# Patient Record
Sex: FEMALE | Race: AMERICAN INDIAN OR ALASKA NATIVE | HISPANIC OR LATINO
[De-identification: names, ages, dates, MRNs, and addresses within clinical notes are randomized per-mention and may not be internally consistent; named-entity substitution may affect disease eponyms.]

---

## 2022-12-21 PROBLEM — Z00.00 ENCOUNTER FOR PREVENTIVE HEALTH EXAMINATION: Status: ACTIVE | Noted: 2022-12-21

## 2022-12-22 ENCOUNTER — APPOINTMENT (OUTPATIENT)
Dept: OBGYN | Facility: CLINIC | Age: 32
End: 2022-12-22

## 2023-12-07 ENCOUNTER — APPOINTMENT (OUTPATIENT)
Dept: NEUROLOGY | Facility: CLINIC | Age: 33
End: 2023-12-07
Payer: MEDICAID

## 2023-12-07 VITALS
SYSTOLIC BLOOD PRESSURE: 100 MMHG | DIASTOLIC BLOOD PRESSURE: 64 MMHG | WEIGHT: 176 LBS | HEIGHT: 62 IN | HEART RATE: 68 BPM | BODY MASS INDEX: 32.39 KG/M2

## 2023-12-07 PROCEDURE — 99204 OFFICE O/P NEW MOD 45 MIN: CPT

## 2024-01-02 ENCOUNTER — APPOINTMENT (OUTPATIENT)
Dept: NEUROLOGY | Facility: CLINIC | Age: 34
End: 2024-01-02
Payer: MEDICAID

## 2024-01-02 VITALS — WEIGHT: 176 LBS | HEIGHT: 62 IN | BODY MASS INDEX: 32.39 KG/M2

## 2024-01-02 DIAGNOSIS — M54.2 CERVICALGIA: ICD-10-CM

## 2024-01-02 DIAGNOSIS — G44.209 TENSION-TYPE HEADACHE, UNSPECIFIED, NOT INTRACTABLE: ICD-10-CM

## 2024-01-02 PROCEDURE — 99214 OFFICE O/P EST MOD 30 MIN: CPT

## 2024-01-02 RX ORDER — DICLOFENAC SODIUM 20 MG/G
2 SOLUTION TOPICAL
Qty: 1 | Refills: 0 | Status: ACTIVE | COMMUNITY
Start: 2024-01-02 | End: 1900-01-01

## 2024-01-02 NOTE — ASSESSMENT
[FreeTextEntry1] : 33 year old female with tension type headaches. Today we reviewed her MRI of the brain noted above. Patient's headaches are primarily isolated to her neck / occiput with tenderness to the surrounding muscle groups without neck pain, radiculopathy  or limited ROM. I have provided a script for her to begin Pt for her neck as well as topical Diclofenac cream to apply PRN. She will return to the office in 2-3 months for follow up and will discuss the above with her PCP in the mean time.

## 2024-01-02 NOTE — HISTORY OF PRESENT ILLNESS
[FreeTextEntry1] : Original Presentation : Patient is a 33-year-old [place]ian speaking female who presents today in neurologic consultation with symptoms of headaches over the past month. They occur on a daily basis in the occipital region and radiate anteriorly. She has these headaches every day. Patient was examined with the aid of a . She takes Tylenol which helps but she has to take the medication every day. Patient's headaches are associated with dizziness. She denies head injury or trauma. She has had no workup for these headaches. Because of the language barrier the ROS is not felt to be complete. She does not volunteer any significant past medical history except for tubal ligation in the past.   Diagnostic Testing :  MRI Brain 12.28.23 : No acute intracranial findings, minimal T2 hyperintensities and FLAIR white matter nonspecific seen in setting of chronic headaches.   Today : Today I had the pleasure of seeing Ms. Chavez  in our office for follow up.  The patients previous history and physical findings have been reviewed.   Ms Chavez remains under our neurologic care for chronic tension headaches. This is a chronic condition for which they are receiving active and continuous care. Today we reviewed her MRI of the brain noted above. Patient continues to experience frequent headaches isolated to her cervico-occipital region associated with episodic dizziness  that lasts seconds before self resolving. Today we discussed activity and diet modifications to prevent headaches such as increasing oral hydration, small meals throughout the day, beginning magnesium oxide supplements daily as well as physical therapy as her headaches are likely triggered by cervical muscle tightness and spasm. On exam patient is Aox3 MAEx4 5/5 power to all extremities with 2+ reflexes throughout. OF NOTE : Patient is currently breastfeeding therefore will not be trying any oral medications for her condition at this time.

## 2024-01-02 NOTE — REASON FOR VISIT
[Follow-Up: _____] : a [unfilled] follow-up visit [Consultation] : a consultation visit [FreeTextEntry1] : Headaches

## 2024-01-02 NOTE — PHYSICAL EXAM
[General Appearance - Alert] : alert [General Appearance - In No Acute Distress] : in no acute distress [Oriented To Time, Place, And Person] : oriented to person, place, and time [Impaired Insight] : insight and judgment were intact [Affect] : the affect was normal [Person] : oriented to person [Place] : oriented to place [Time] : oriented to time [Concentration Intact] : normal concentrating ability [Visual Intact] : visual attention was ~T not ~L decreased [Naming Objects] : no difficulty naming common objects [Repeating Phrases] : no difficulty repeating a phrase [Writing A Sentence] : no difficulty writing a sentence [Fluency] : fluency intact [Comprehension] : comprehension intact [Reading] : reading intact [Past History] : adequate knowledge of personal past history [Cranial Nerves Optic (II)] : visual acuity intact bilaterally,  visual fields full to confrontation, pupils equal round and reactive to light [Cranial Nerves Oculomotor (III)] : extraocular motion intact [Cranial Nerves Trigeminal (V)] : facial sensation intact symmetrically [Cranial Nerves Facial (VII)] : face symmetrical [Cranial Nerves Vestibulocochlear (VIII)] : hearing was intact bilaterally [Cranial Nerves Glossopharyngeal (IX)] : tongue and palate midline [Cranial Nerves Accessory (XI - Cranial And Spinal)] : head turning and shoulder shrug symmetric [Cranial Nerves Hypoglossal (XII)] : there was no tongue deviation with protrusion [Motor Strength] : muscle strength was normal in all four extremities [No Muscle Atrophy] : normal bulk in all four extremities [Motor Strength Upper Extremities Bilaterally] : strength was normal in both upper extremities [Motor Strength Lower Extremities Bilaterally] : strength was normal in both lower extremities [Sensation Tactile Decrease] : light touch was intact [Balance] : balance was intact [Past-pointing] : there was no past-pointing [Tremor] : no tremor present [2+] : Ankle jerk left 2+ [Plantar Reflex Right Only] : normal on the right [Plantar Reflex Left Only] : normal on the left [PERRL With Normal Accommodation] : pupils were equal in size, round, reactive to light, with normal accommodation [Extraocular Movements] : extraocular movements were intact [Hearing Threshold Finger Rub Not Ashe] : hearing was normal [Neck Appearance] : the appearance of the neck was normal [FreeTextEntry1] : Mild tenderness to palpation of paraspinal and trapezius muscle groups.  [No Spinal Tenderness] : no spinal tenderness [Abnormal Walk] : normal gait [Involuntary Movements] : no involuntary movements were seen [Motor Tone] : muscle strength and tone were normal

## 2024-04-08 ENCOUNTER — APPOINTMENT (OUTPATIENT)
Dept: NEUROLOGY | Facility: CLINIC | Age: 34
End: 2024-04-08